# Patient Record
Sex: FEMALE | Race: WHITE | Employment: FULL TIME | URBAN - NONMETROPOLITAN AREA
[De-identification: names, ages, dates, MRNs, and addresses within clinical notes are randomized per-mention and may not be internally consistent; named-entity substitution may affect disease eponyms.]

---

## 2017-05-28 ENCOUNTER — OFFICE VISIT (OUTPATIENT)
Dept: PRIMARY CARE CLINIC | Age: 27
End: 2017-05-28
Payer: COMMERCIAL

## 2017-05-28 VITALS
HEART RATE: 119 BPM | RESPIRATION RATE: 18 BRPM | WEIGHT: 171.8 LBS | BODY MASS INDEX: 30.44 KG/M2 | HEIGHT: 63 IN | DIASTOLIC BLOOD PRESSURE: 84 MMHG | SYSTOLIC BLOOD PRESSURE: 128 MMHG | OXYGEN SATURATION: 97 % | TEMPERATURE: 98.3 F

## 2017-05-28 DIAGNOSIS — G89.29 CHRONIC MIDLINE LOW BACK PAIN WITHOUT SCIATICA: Primary | ICD-10-CM

## 2017-05-28 DIAGNOSIS — M54.50 CHRONIC MIDLINE LOW BACK PAIN WITHOUT SCIATICA: Primary | ICD-10-CM

## 2017-05-28 PROCEDURE — 99213 OFFICE O/P EST LOW 20 MIN: CPT | Performed by: FAMILY MEDICINE

## 2017-05-28 PROCEDURE — 96372 THER/PROPH/DIAG INJ SC/IM: CPT | Performed by: FAMILY MEDICINE

## 2017-05-28 RX ORDER — KETOROLAC TROMETHAMINE 30 MG/ML
30 INJECTION, SOLUTION INTRAMUSCULAR; INTRAVENOUS ONCE
Status: COMPLETED | OUTPATIENT
Start: 2017-05-28 | End: 2017-05-28

## 2017-05-28 RX ADMIN — KETOROLAC TROMETHAMINE 30 MG: 30 INJECTION, SOLUTION INTRAMUSCULAR; INTRAVENOUS at 16:06

## 2017-05-28 ASSESSMENT — ENCOUNTER SYMPTOMS
DIARRHEA: 0
ABDOMINAL PAIN: 0
BACK PAIN: 1
CONSTIPATION: 0
BOWEL INCONTINENCE: 0

## 2019-07-20 ENCOUNTER — OFFICE VISIT (OUTPATIENT)
Dept: PRIMARY CARE CLINIC | Age: 29
End: 2019-07-20
Payer: COMMERCIAL

## 2019-07-20 ENCOUNTER — HOSPITAL ENCOUNTER (OUTPATIENT)
Age: 29
Setting detail: SPECIMEN
Discharge: HOME | End: 2019-07-20
Payer: COMMERCIAL

## 2019-07-20 VITALS
TEMPERATURE: 98.2 F | SYSTOLIC BLOOD PRESSURE: 110 MMHG | WEIGHT: 169.8 LBS | DIASTOLIC BLOOD PRESSURE: 82 MMHG | HEART RATE: 64 BPM | BODY MASS INDEX: 30.09 KG/M2 | HEIGHT: 63 IN

## 2019-07-20 DIAGNOSIS — R30.0 DYSURIA: ICD-10-CM

## 2019-07-20 DIAGNOSIS — R21 RASH AND NONSPECIFIC SKIN ERUPTION: ICD-10-CM

## 2019-07-20 DIAGNOSIS — R30.0 DYSURIA: Primary | ICD-10-CM

## 2019-07-20 LAB
-: ABNORMAL
AMORPHOUS: ABNORMAL
BACTERIA: ABNORMAL
BILIRUBIN URINE: NEGATIVE
CASTS UA: ABNORMAL /LPF (ref 0–2)
COLOR: ABNORMAL
COMMENT UA: ABNORMAL
CRYSTALS, UA: ABNORMAL /HPF
DIRECT EXAM: ABNORMAL
EPITHELIAL CELLS UA: ABNORMAL /HPF (ref 0–5)
GLUCOSE URINE: NEGATIVE
KETONES, URINE: NEGATIVE
LEUKOCYTE ESTERASE, URINE: NEGATIVE
Lab: ABNORMAL
MUCUS: ABNORMAL
NITRITE, URINE: NEGATIVE
OTHER OBSERVATIONS UA: ABNORMAL
PH UA: 7 (ref 5–6)
PROTEIN UA: NEGATIVE
RBC UA: ABNORMAL /HPF (ref 0–4)
RENAL EPITHELIAL, UA: ABNORMAL /HPF
SPECIFIC GRAVITY UA: 1.01 (ref 1.01–1.02)
SPECIMEN DESCRIPTION: ABNORMAL
TRICHOMONAS: ABNORMAL
TURBIDITY: ABNORMAL
URINE HGB: NEGATIVE
UROBILINOGEN, URINE: NORMAL
WBC UA: ABNORMAL /HPF (ref 0–4)
YEAST: ABNORMAL

## 2019-07-20 PROCEDURE — 87660 TRICHOMONAS VAGIN DIR PROBE: CPT

## 2019-07-20 PROCEDURE — 87510 GARDNER VAG DNA DIR PROBE: CPT

## 2019-07-20 PROCEDURE — G8427 DOCREV CUR MEDS BY ELIG CLIN: HCPCS | Performed by: NURSE PRACTITIONER

## 2019-07-20 PROCEDURE — 99213 OFFICE O/P EST LOW 20 MIN: CPT | Performed by: NURSE PRACTITIONER

## 2019-07-20 PROCEDURE — 4004F PT TOBACCO SCREEN RCVD TLK: CPT | Performed by: NURSE PRACTITIONER

## 2019-07-20 PROCEDURE — 81001 URINALYSIS AUTO W/SCOPE: CPT

## 2019-07-20 PROCEDURE — G8417 CALC BMI ABV UP PARAM F/U: HCPCS | Performed by: NURSE PRACTITIONER

## 2019-07-20 PROCEDURE — 87086 URINE CULTURE/COLONY COUNT: CPT

## 2019-07-20 PROCEDURE — 87480 CANDIDA DNA DIR PROBE: CPT

## 2019-07-20 RX ORDER — TRIAMCINOLONE ACETONIDE 1 MG/G
CREAM TOPICAL
Qty: 1 TUBE | Refills: 1 | Status: SHIPPED | OUTPATIENT
Start: 2019-07-20

## 2019-07-20 RX ORDER — IBUPROFEN 200 MG
200 TABLET ORAL PRN
COMMUNITY

## 2019-07-20 RX ORDER — ARIPIPRAZOLE 5 MG/1
5 TABLET ORAL DAILY
COMMUNITY
Start: 2019-04-18

## 2019-07-20 RX ORDER — CYCLOBENZAPRINE HCL 10 MG
10 TABLET ORAL PRN
COMMUNITY
Start: 2018-01-24

## 2019-07-20 ASSESSMENT — ENCOUNTER SYMPTOMS
RESPIRATORY NEGATIVE: 1
RHINORRHEA: 0
NAUSEA: 0
SORE THROAT: 0
COUGH: 0
DIARRHEA: 0
SHORTNESS OF BREATH: 0
VOMITING: 0
GASTROINTESTINAL NEGATIVE: 1

## 2019-07-20 NOTE — PATIENT INSTRUCTIONS
Patient Education        Painful Urination (Dysuria): Care Instructions  Your Care Instructions  Burning pain with urination (dysuria) is a common symptom of a urinary tract infection or other urinary problems. The bladder may become inflamed. This can cause pain when the bladder fills and empties. You may also feel pain if the tube that carries urine from the bladder to the outside of the body (urethra) gets irritated or infected. Sexually transmitted infections (STIs) also may cause pain when you urinate. Sometimes the pain can be caused by things other than an infection. The urethra can be irritated by soaps, perfumes, or foreign objects in the urethra. Kidney stones can cause pain when they pass through the urethra. The cause may be hard to find. You may need tests. Treatment for painful urination depends on the cause. Follow-up care is a key part of your treatment and safety. Be sure to make and go to all appointments, and call your doctor if you are having problems. It's also a good idea to know your test results and keep a list of the medicines you take. How can you care for yourself at home? · Drink extra water for the next day or two. This will help make the urine less concentrated. (If you have kidney, heart, or liver disease and have to limit fluids, talk with your doctor before you increase the amount of fluids you drink.)  · Avoid drinks that are carbonated or have caffeine. They can irritate the bladder. · Urinate often. Try to empty your bladder each time. For women:  · Urinate right after you have sex. · After going to the bathroom, wipe from front to back. · Avoid douches, bubble baths, and feminine hygiene sprays. And avoid other feminine hygiene products that have deodorants. When should you call for help? Call your doctor now or seek immediate medical care if:    · You have new symptoms, such as fever, nausea, or vomiting.     · You have new or worse symptoms of a urinary problem. For example:  ? You have blood or pus in your urine. ? You have chills or body aches. ? It hurts worse to urinate. ? You have groin or belly pain. ? You have pain in your back just below your rib cage (the flank area).    Watch closely for changes in your health, and be sure to contact your doctor if you have any problems. Where can you learn more? Go to [email or web address]. org and sign in to your [employer] account. Enter P277 in the BitIn[employer] box to learn more about \"Painful Urination (Dysuria): Care Instructions. \"     If you do not have an account, please click on the \"Sign Up Now\" link. Current as of: December 19, 2018  Content Version: 12.0  © 1667-5958 [employer]. Care instructions adapted under license by [facility] ([facility]). If you have questions about a medical condition or this instruction, always ask your healthcare professional. [place] [address] any warranty or liability for your use of this information. Patient Education        Rash: Care Instructions  Your Care Instructions  A rash is any irritation or inflammation of the skin. Rashes have many possible causes, including allergy, infection, illness, heat, and emotional stress. Follow-up care is a key part of your treatment and safety. Be sure to make and go to all appointments, and call your doctor if you are having problems. It's also a good idea to know your test results and keep a list of the medicines you take. How can you care for yourself at home? · Wash the area with water only. Soap can make dryness and itching worse. Pat dry. · Put cold, wet cloths on the rash to reduce itching. · Keep cool, and stay out of the sun. · Leave the rash open to the air as much of the time as possible. · Sometimes petroleum jelly (Vaseline) can help relieve the discomfort caused by a rash. A moisturizing lotion, such as Cetaphil, also may help.  Calamine lotion may help for rashes caused by

## 2019-07-21 DIAGNOSIS — B37.31 VAGINAL YEAST INFECTION: Primary | ICD-10-CM

## 2019-07-21 LAB
CULTURE: NORMAL
Lab: NORMAL
SPECIMEN DESCRIPTION: NORMAL

## 2019-07-21 RX ORDER — FLUCONAZOLE 150 MG/1
TABLET ORAL
Qty: 2 TABLET | Refills: 0 | Status: SHIPPED | OUTPATIENT
Start: 2019-07-21

## 2021-03-24 ENCOUNTER — HOSPITAL ENCOUNTER (EMERGENCY)
Age: 31
Discharge: HOME | End: 2021-03-25
Attending: EMERGENCY MEDICINE
Payer: COMMERCIAL

## 2021-03-24 DIAGNOSIS — T88.7XXA MEDICATION SIDE EFFECT: ICD-10-CM

## 2021-03-24 DIAGNOSIS — R07.9 CHEST PAIN, UNSPECIFIED TYPE: Primary | ICD-10-CM

## 2021-03-24 LAB
ABSOLUTE EOS #: 0.03 K/UL (ref 0–0.44)
ABSOLUTE IMMATURE GRANULOCYTE: 0.03 K/UL (ref 0–0.3)
ABSOLUTE LYMPH #: 4.32 K/UL (ref 1.1–3.7)
ABSOLUTE MONO #: 0.76 K/UL (ref 0.1–1.2)
ANION GAP SERPL CALCULATED.3IONS-SCNC: 14 MMOL/L (ref 9–17)
BASOPHILS # BLD: 0 % (ref 0–2)
BASOPHILS ABSOLUTE: 0.04 K/UL (ref 0–0.2)
BUN BLDV-MCNC: 11 MG/DL (ref 6–20)
BUN/CREAT BLD: 13 (ref 9–20)
CALCIUM SERPL-MCNC: 9.4 MG/DL (ref 8.6–10.4)
CHLORIDE BLD-SCNC: 107 MMOL/L (ref 98–107)
CO2: 23 MMOL/L (ref 20–31)
CREAT SERPL-MCNC: 0.88 MG/DL (ref 0.5–0.9)
D-DIMER QUANTITATIVE: <0.27 MG/L FEU (ref 0–0.59)
DIFFERENTIAL TYPE: ABNORMAL
EOSINOPHILS RELATIVE PERCENT: 0 % (ref 1–4)
GFR AFRICAN AMERICAN: >60 ML/MIN
GFR NON-AFRICAN AMERICAN: >60 ML/MIN
GFR SERPL CREATININE-BSD FRML MDRD: ABNORMAL ML/MIN/{1.73_M2}
GFR SERPL CREATININE-BSD FRML MDRD: ABNORMAL ML/MIN/{1.73_M2}
GLUCOSE BLD-MCNC: 166 MG/DL (ref 70–99)
HCT VFR BLD CALC: 42.3 % (ref 36.3–47.1)
HEMOGLOBIN: 13.8 G/DL (ref 11.9–15.1)
IMMATURE GRANULOCYTES: 0 %
LYMPHOCYTES # BLD: 31 % (ref 24–43)
MCH RBC QN AUTO: 30.4 PG (ref 25.2–33.5)
MCHC RBC AUTO-ENTMCNC: 32.6 G/DL (ref 25.2–33.5)
MCV RBC AUTO: 93.2 FL (ref 82.6–102.9)
MONOCYTES # BLD: 6 % (ref 3–12)
NRBC AUTOMATED: 0 PER 100 WBC
PDW BLD-RTO: 12.1 % (ref 11.8–14.4)
PLATELET # BLD: 294 K/UL (ref 138–453)
PLATELET ESTIMATE: ABNORMAL
PMV BLD AUTO: 9 FL (ref 8.1–13.5)
POC TROPONIN I: <0.02 NG/ML (ref 0–0.08)
POC TROPONIN INTERP: NORMAL
POTASSIUM SERPL-SCNC: 3.3 MMOL/L (ref 3.7–5.3)
RBC # BLD: 4.54 M/UL (ref 3.95–5.11)
RBC # BLD: ABNORMAL 10*6/UL
SEG NEUTROPHILS: 63 % (ref 36–65)
SEGMENTED NEUTROPHILS ABSOLUTE COUNT: 8.71 K/UL (ref 1.5–8.1)
SODIUM BLD-SCNC: 144 MMOL/L (ref 135–144)
WBC # BLD: 13.9 K/UL (ref 3.5–11.3)
WBC # BLD: ABNORMAL 10*3/UL

## 2021-03-24 PROCEDURE — 99283 EMERGENCY DEPT VISIT LOW MDM: CPT

## 2021-03-24 PROCEDURE — 85025 COMPLETE CBC W/AUTO DIFF WBC: CPT

## 2021-03-24 PROCEDURE — 81025 URINE PREGNANCY TEST: CPT

## 2021-03-24 PROCEDURE — 85379 FIBRIN DEGRADATION QUANT: CPT

## 2021-03-24 PROCEDURE — 36415 COLL VENOUS BLD VENIPUNCTURE: CPT

## 2021-03-24 PROCEDURE — 80048 BASIC METABOLIC PNL TOTAL CA: CPT

## 2021-03-24 PROCEDURE — 81001 URINALYSIS AUTO W/SCOPE: CPT

## 2021-03-24 PROCEDURE — 84484 ASSAY OF TROPONIN QUANT: CPT

## 2021-03-24 PROCEDURE — 93005 ELECTROCARDIOGRAM TRACING: CPT | Performed by: EMERGENCY MEDICINE

## 2021-03-24 ASSESSMENT — PAIN DESCRIPTION - LOCATION: LOCATION: CHEST

## 2021-03-24 ASSESSMENT — PAIN DESCRIPTION - FREQUENCY: FREQUENCY: CONTINUOUS

## 2021-03-24 ASSESSMENT — PAIN DESCRIPTION - DESCRIPTORS: DESCRIPTORS: DULL

## 2021-03-25 ENCOUNTER — APPOINTMENT (OUTPATIENT)
Dept: GENERAL RADIOLOGY | Age: 31
End: 2021-03-25
Payer: COMMERCIAL

## 2021-03-25 VITALS
RESPIRATION RATE: 17 BRPM | WEIGHT: 169 LBS | HEART RATE: 82 BPM | TEMPERATURE: 98.8 F | HEIGHT: 63 IN | SYSTOLIC BLOOD PRESSURE: 109 MMHG | DIASTOLIC BLOOD PRESSURE: 82 MMHG | BODY MASS INDEX: 29.95 KG/M2 | OXYGEN SATURATION: 98 %

## 2021-03-25 LAB
-: ABNORMAL
AMORPHOUS: ABNORMAL
BACTERIA: ABNORMAL
BILIRUBIN URINE: NEGATIVE
CASTS UA: ABNORMAL /LPF (ref 0–2)
COLOR: NORMAL
COMMENT UA: NORMAL
CRYSTALS, UA: ABNORMAL /HPF
EPITHELIAL CELLS UA: >50 /HPF (ref 0–5)
GLUCOSE URINE: NEGATIVE
HCG(URINE) PREGNANCY TEST: NEGATIVE
KETONES, URINE: NEGATIVE
LEUKOCYTE ESTERASE, URINE: NEGATIVE
MUCUS: ABNORMAL
NITRITE, URINE: NEGATIVE
OTHER OBSERVATIONS UA: ABNORMAL
PH UA: 6 (ref 5–6)
POC TROPONIN I: <0.02 NG/ML (ref 0–0.08)
POC TROPONIN INTERP: NORMAL
PROTEIN UA: NEGATIVE
RBC UA: ABNORMAL /HPF (ref 0–4)
RENAL EPITHELIAL, UA: ABNORMAL /HPF
SPECIFIC GRAVITY UA: 1.02 (ref 1.01–1.02)
TRICHOMONAS: ABNORMAL
TURBIDITY: NORMAL
URINE HGB: NEGATIVE
UROBILINOGEN, URINE: NORMAL
WBC UA: ABNORMAL /HPF (ref 0–4)
YEAST: ABNORMAL

## 2021-03-25 PROCEDURE — 71046 X-RAY EXAM CHEST 2 VIEWS: CPT

## 2021-03-25 PROCEDURE — 84484 ASSAY OF TROPONIN QUANT: CPT

## 2021-03-25 PROCEDURE — 36415 COLL VENOUS BLD VENIPUNCTURE: CPT

## 2021-03-25 PROCEDURE — 93005 ELECTROCARDIOGRAM TRACING: CPT | Performed by: EMERGENCY MEDICINE

## 2021-03-25 PROCEDURE — 6370000000 HC RX 637 (ALT 250 FOR IP): Performed by: EMERGENCY MEDICINE

## 2021-03-25 RX ORDER — ACETAMINOPHEN 500 MG
1000 TABLET ORAL ONCE
Status: COMPLETED | OUTPATIENT
Start: 2021-03-25 | End: 2021-03-25

## 2021-03-25 RX ADMIN — ACETAMINOPHEN 1000 MG: 500 TABLET, FILM COATED ORAL at 01:08

## 2021-03-25 ASSESSMENT — ENCOUNTER SYMPTOMS
COUGH: 0
VOMITING: 0
NAUSEA: 0

## 2021-03-25 NOTE — ED PROVIDER NOTES
[facility] ED  [address]  [place] [address]  Phone: [phone]  eMERGENCY dEPARTMENT eNCOUnter      Pt Name: Jg Goff  MRN: [id number]  Melida 1990  Date of evaluation: 3/25/21      CHIEF COMPLAINT     Chief Complaint   Patient presents with    Chest Pain     took hydroxyzine and 2 baby asprin PTA, states she felt her heart race, usually is r/t to anxiety but this time she is unsure. HISTORY OF PRESENT ILLNESS    Glenda Martines is a 31 y.o. female who presents today with complaints of chest pain that came on at rest.  She also felt that her heart was racing. Not specifically short of breath. Patient has had some episodes related to anxiety and stress before. Is not sure why she is having the symptoms at this time. Patient denies first-degree relatives with coronary artery disease. She denies a history of hypertension hyperlipidemia diabetes patient does vape. Patient also admits to occasional marijuana use. Patient denies history of amphetamine methamphetamine or cocaine use. Patient has not recently used marijuana. Patient did indicate that she is taking a Medrol Dosepak. Patient has had related anxiety symptoms related to steroids in the past.  Patient was prescribed Vistaril which she took prior to arrival with this a steroid course. Patient uses Depo-Provera injections for birth control. REVIEW OF SYSTEMS     Review of Systems   Constitutional: Negative for fever. HENT: Negative for congestion. Respiratory: Negative for cough. Cardiovascular: Positive for chest pain. Negative for leg swelling. Gastrointestinal: Negative for nausea and vomiting. Skin: Negative for rash. Neurological: Negative for syncope. Psychiatric/Behavioral: Negative for confusion. All other systems reviewed and are negative.     PAST MEDICAL HISTORY    has a past medical history of Acne, Anxiety, Depression, Headache(784.0), Hypoglycemia, and Irritable bowel syndrome. SURGICAL HISTORY      has a past surgical history that includes [place] tooth extraction (5/2014). CURRENT MEDICATIONS       Discharge Medication List as of 3/25/2021  1:54 AM      CONTINUE these medications which have NOT CHANGED    Details   fluconazole (DIFLUCAN) 150 MG tablet Take 1 tab now and then repeat in 1 week., Disp-2 tablet, R-0Normal      ARIPiprazole (ABILIFY) 5 MG tablet Take 5 mg by mouth dailyHistorical Med      cyclobenzaprine (FLEXERIL) 10 MG tablet Take 10 mg by mouth as neededHistorical Med      ibuprofen (ADVIL;MOTRIN) 200 MG tablet Take 200 mg by mouth as neededHistorical Med      sertraline (ZOLOFT) 50 MG tablet Take 50 mg by mouth dailyHistorical Med      triamcinolone (KENALOG) 0.1 % cream Apply topically 2 times daily. , Disp-1 Tube, R-1, Normal      medroxyPROGESTERone (DEPO-PROVERA) 150 MG/ML injection Inject 150 mg into the muscle once. Every three months             ALLERGIES     is allergic to morphine. FAMILY HISTORY     She indicated that her mother is alive. She indicated that her father is alive. She indicated that her brother is alive. She indicated that her maternal grandmother is alive. She indicated that her maternal grandfather is alive. She indicated that her paternal grandmother is alive. She indicated that her paternal grandfather is alive. family history includes Cancer in her maternal grandfather; Other in her mother. SOCIAL HISTORY      reports that she has been smoking. She has a 0.75 pack-year smoking history. She has never used smokeless tobacco. She reports current alcohol use. She reports that she does not use drugs. PHYSICAL EXAM     INITIAL VITALS:  height is 5' 3\" (1.6 m) and weight is 169 lb (76.7 kg). Her tympanic temperature is 98.8 °F (37.1 °C). Her blood pressure is 109/82 and her pulse is 82. Her respiration is 17 and oxygen saturation is 98%. Physical Exam  Vitals signs reviewed.    Constitutional:       General: She is not in acute distress. Appearance: Normal appearance. She is not ill-appearing. HENT:      Head: Normocephalic and atraumatic. Eyes:      Extraocular Movements: Extraocular movements intact. Pupils: Pupils are equal, round, and reactive to light. Cardiovascular:      Rate and Rhythm: Normal rate and regular rhythm. Pulses: Normal pulses. Heart sounds: Normal heart sounds. Pulmonary:      Breath sounds: Normal breath sounds. No wheezing. Abdominal:      Palpations: Abdomen is soft. Tenderness: There is no abdominal tenderness. There is no guarding or rebound. Musculoskeletal: Normal range of motion. General: No tenderness. Right lower leg: No edema. Left lower leg: No edema. Skin:     General: Skin is warm and dry. Capillary Refill: Capillary refill takes less than 2 seconds. Findings: No rash. Neurological:      General: No focal deficit present. Mental Status: She is alert and oriented to person, place, and time. Cranial Nerves: No cranial nerve deficit. Psychiatric:         Mood and Affect: Mood normal.         Behavior: Behavior normal.       DIFFERENTIAL DIAGNOSIS / MDM / EMERGENCY DEPARTMENT COURSE:     Patient overall low risk for ACS but given that she describes the chest discomfort as a tightness in the chest will do cardiac evaluation including serial troponins and EKGs. Patient is overall lower risk for pulmonary embolism however given hormone therapy and tachycardia upon arrival to the emergency department will screen with D-dimer. This test was negative. Repeat troponin which was drawn within 3 hours after the onset of her symptoms is also negative. At this point the patient symptoms have resolved. I feel that she is appropriate for outpatient management although she was educated to return if she has any return of chest pain or pressure or shortness of breath.   This may be related to her recent steroid course that she started. I have reviewed the disposition diagnosis with the patient and or their family/guardian. I have answered their questions and givendischarge instructions. They voiced understanding of these instructions and did not have any further questions or complaints. DIAGNOSTIC RESULTS     EKG: All EKG's are interpreted by the Emergency Department Physician who either signs or Co-signs this chart inthe absence of a cardiologist.    Initial twelve-lead EKG in the emergency department time to 2245 normal sinus rhythm at 94 bpm.  Normal intervals and axis. No acute ST elevations or depressions but nonspecific ST-T wave abnormality is noted. Patient states has never had EKG done before for comparison. Repeat twelve-lead EKG time 0: 49 normal sinus rhythm 85 bpm.  Normal intervals and axis. No acute ST elevations depressions or T wave inversions but there is a nonspecific ST-T wave abnormality. RADIOLOGY:   I directly visualized the following plain film images and reviewed the radiologistinterpretations of radiologic studies:    Xr Chest (2 Vw)    Result Date: 3/25/2021  EXAMINATION: TWO XRAY VIEWS OF THE CHEST 3/24/2021 11:57 pm COMPARISON: 12/21/2011 HISTORY: ORDERING SYSTEM PROVIDED HISTORY: chest pain / leukocytosis TECHNOLOGIST PROVIDED HISTORY: chest pain / leukocytosis Reason for Exam: Chest pain today, elevated WBC Acuity: Acute Type of Exam: Initial FINDINGS: The heart is normal.  The pulmonary vessels are normal.  The lungs are mildly hyperinflated and emphysematous. There is mild scoliosis. No consolidation or effusion is seen. The bones are intact. Stable chronic changes with no acute abnormality seen.      LABS:  Results for orders placed or performed during the hospital encounter of 03/24/21   CBC Auto Differential   Result Value Ref Range    WBC 13.9 (H) 3.5 - 11.3 k/uL    RBC 4.54 3.95 - 5.11 m/uL    Hemoglobin 13.8 11.9 - 15.1 g/dL    Hematocrit 42.3 36.3 - 47.1 %    MCV 93.2 82.6 - 102.9 fL    MCH 30.4 25.2 - 33.5 pg    MCHC 32.6 25.2 - 33.5 g/dL    RDW 12.1 11.8 - 14.4 %    Platelets 029 060 - 023 k/uL    MPV 9.0 8.1 - 13.5 fL    NRBC Automated 0.0 0.0 per 100 WBC    Differential Type NOT REPORTED     Seg Neutrophils 63 36 - 65 %    Lymphocytes 31 24 - 43 %    Monocytes 6 3 - 12 %    Eosinophils % 0 (L) 1 - 4 %    Basophils 0 0 - 2 %    Immature Granulocytes 0 0 %    Segs Absolute 8.71 (H) 1.50 - 8.10 k/uL    Absolute Lymph # 4.32 (H) 1.10 - 3.70 k/uL    Absolute Mono # 0.76 0.10 - 1.20 k/uL    Absolute Eos # 0.03 0.00 - 0.44 k/uL    Basophils Absolute 0.04 0.00 - 0.20 k/uL    Absolute Immature Granulocyte 0.03 0.00 - 0.30 k/uL    WBC Morphology NOT REPORTED     RBC Morphology NOT REPORTED     Platelet Estimate NOT REPORTED    Basic Metabolic Panel   Result Value Ref Range    Glucose 166 (H) 70 - 99 mg/dL    BUN 11 6 - 20 mg/dL    CREATININE 0.88 0.50 - 0.90 mg/dL    Bun/Cre Ratio 13 9 - 20    Calcium 9.4 8.6 - 10.4 mg/dL    Sodium 144 135 - 144 mmol/L    Potassium 3.3 (L) 3.7 - 5.3 mmol/L    Chloride 107 98 - 107 mmol/L    CO2 23 20 - 31 mmol/L    Anion Gap 14 9 - 17 mmol/L    GFR Non-African American >60 >60 mL/min    GFR African American >60 >60 mL/min    GFR Comment          GFR Staging NOT REPORTED    D-Dimer, Quantitative   Result Value Ref Range    D-Dimer, Quant <0.27 0.00 - 0.59 mg/L FEU   Urinalysis Reflex to Culture    Specimen: Urine, clean catch   Result Value Ref Range    Color, UA NOT REPORTED YELLOW    Turbidity UA NOT REPORTED CLEAR    Glucose, Ur NEGATIVE NEGATIVE    Bilirubin Urine NEGATIVE NEGATIVE    Ketones, Urine NEGATIVE NEGATIVE    Specific Gravity, UA 1.020 1.010 - 1.025    Urine Hgb NEGATIVE NEGATIVE    pH, UA 6.0 5.0 - 6.0    Protein, UA NEGATIVE NEGATIVE    Urobilinogen, Urine Normal Normal    Nitrite, Urine NEGATIVE NEGATIVE    Leukocyte Esterase, Urine NEGATIVE NEGATIVE    Urinalysis Comments NOT REPORTED    Pregnancy, Urine   Result Value Ref Range HCG(Urine) Pregnancy Test NEGATIVE NEGATIVE   Microscopic Urinalysis   Result Value Ref Range    -          WBC, UA 0 TO 4 0 - 4 /HPF    RBC, UA 0 TO 4 0 - 4 /HPF    Casts UA NOT REPORTED 0 - 2 /LPF    Crystals, UA NOT REPORTED None /HPF    Epithelial Cells UA >50 0 - 5 /HPF    Renal Epithelial, UA NOT REPORTED 0 /HPF    Bacteria, UA 1+ (A) None    Mucus, UA NOT REPORTED None    Trichomonas, UA NOT REPORTED None    Amorphous, UA NOT REPORTED None    Other Observations UA NOT REPORTED NOT REQ. Yeast, UA NOT REPORTED None   POCT troponin   Result Value Ref Range    POC Troponin I <0.02 0.00 - 0.08 ng/mL    POC Troponin Interp         POCT troponin   Result Value Ref Range    POC Troponin I <0.02 0.00 - 0.08 ng/mL    POC Troponin Interp         EKG 12 Lead   Result Value Ref Range    Ventricular Rate 94 BPM    Atrial Rate 94 BPM    P-R Interval 142 ms    QRS Duration 82 ms    Q-T Interval 338 ms    QTc Calculation (Bazett) 422 ms    P Axis 45 degrees    R Axis 37 degrees    T Axis 3 degrees   EKG 12 Lead   Result Value Ref Range    Ventricular Rate 85 BPM    Atrial Rate 85 BPM    P-R Interval 134 ms    QRS Duration 84 ms    Q-T Interval 354 ms    QTc Calculation (Bazett) 421 ms    P Axis 42 degrees    R Axis 42 degrees    T Axis 24 degrees       EMERGENCY DEPARTMENT COURSE:   Vitals:    Vitals:    03/24/21 2326 03/25/21 0109 03/25/21 0130 03/25/21 0153   BP:   109/82    Pulse: 93 88 85 82   Resp: 14 15 20 17   Temp:       TempSrc:       SpO2: 99% 97% 99% 98%   Weight:       Height:         -------------------------  BP: 109/82, Temp: 98.8 °F (37.1 °C), Pulse: 82, Resp: 17      CONSULTS:  None    PROCEDURES:  None    FINAL IMPRESSION      1. Chest pain, unspecified type    2.  Medication side effect          DISPOSITION/PLAN   DISPOSITION Decision To Discharge 03/25/2021 01:53:39 AM      PATIENT REFERRED TO:  Edwardo Souza MD  [address] [id number]  [place] [address] [place]  [phone]    In 2 days        DISCHARGE MEDICATIONS:  Discharge Medication List as of 3/25/2021  1:54 AM          (Please note that portions of this note were completed with avoice recognition program.  Efforts were made to edit the dictations but occasionally words are mis-transcribed.)    Cruzito Khoury MD, [facility]  Attending Emergency Medicine Physician        Cruzito Khoury MD  03/25/21 0700

## 2021-03-26 LAB
EKG ATRIAL RATE: 85 BPM
EKG ATRIAL RATE: 94 BPM
EKG P AXIS: 42 DEGREES
EKG P AXIS: 45 DEGREES
EKG P-R INTERVAL: 134 MS
EKG P-R INTERVAL: 142 MS
EKG Q-T INTERVAL: 338 MS
EKG Q-T INTERVAL: 354 MS
EKG QRS DURATION: 82 MS
EKG QRS DURATION: 84 MS
EKG QTC CALCULATION (BAZETT): 421 MS
EKG QTC CALCULATION (BAZETT): 422 MS
EKG R AXIS: 37 DEGREES
EKG R AXIS: 42 DEGREES
EKG T AXIS: 24 DEGREES
EKG T AXIS: 3 DEGREES
EKG VENTRICULAR RATE: 85 BPM
EKG VENTRICULAR RATE: 94 BPM

## 2024-09-24 ENCOUNTER — HOSPITAL ENCOUNTER (INPATIENT)
Age: 34
LOS: 3 days | Discharge: HOME | End: 2024-09-27
Attending: PSYCHIATRY & NEUROLOGY | Admitting: PSYCHIATRY & NEUROLOGY

## 2024-09-24 PROBLEM — R45.851 DEPRESSION WITH SUICIDAL IDEATION: Status: ACTIVE | Noted: 2024-09-24

## 2024-09-24 PROBLEM — F32.A DEPRESSION WITH SUICIDAL IDEATION: Status: ACTIVE | Noted: 2024-09-24

## 2024-09-24 PROCEDURE — 1240000000 HC EMOTIONAL WELLNESS R&B

## 2024-09-24 PROCEDURE — 6370000000 HC RX 637 (ALT 250 FOR IP): Performed by: PSYCHIATRY & NEUROLOGY

## 2024-09-24 RX ORDER — DIPHENHYDRAMINE HYDROCHLORIDE 50 MG/ML
50 INJECTION INTRAMUSCULAR; INTRAVENOUS EVERY 6 HOURS PRN
Status: DISCONTINUED | OUTPATIENT
Start: 2024-09-24 | End: 2024-09-27 | Stop reason: HOSPADM

## 2024-09-24 RX ORDER — POLYETHYLENE GLYCOL 3350 17 G/17G
17 POWDER, FOR SOLUTION ORAL DAILY PRN
Status: DISCONTINUED | OUTPATIENT
Start: 2024-09-24 | End: 2024-09-27 | Stop reason: HOSPADM

## 2024-09-24 RX ORDER — HALOPERIDOL 5 MG/1
5 TABLET ORAL EVERY 6 HOURS PRN
Status: DISCONTINUED | OUTPATIENT
Start: 2024-09-24 | End: 2024-09-27 | Stop reason: HOSPADM

## 2024-09-24 RX ORDER — POLYETHYLENE GLYCOL 3350 17 G
2 POWDER IN PACKET (EA) ORAL
Status: DISCONTINUED | OUTPATIENT
Start: 2024-09-24 | End: 2024-09-27 | Stop reason: HOSPADM

## 2024-09-24 RX ORDER — HALOPERIDOL 5 MG/ML
5 INJECTION INTRAMUSCULAR EVERY 6 HOURS PRN
Status: DISCONTINUED | OUTPATIENT
Start: 2024-09-24 | End: 2024-09-27 | Stop reason: HOSPADM

## 2024-09-24 RX ORDER — HYDROXYZINE HYDROCHLORIDE 50 MG/1
50 TABLET, FILM COATED ORAL 3 TIMES DAILY PRN
Status: DISCONTINUED | OUTPATIENT
Start: 2024-09-24 | End: 2024-09-27 | Stop reason: HOSPADM

## 2024-09-24 RX ORDER — TRAZODONE HYDROCHLORIDE 50 MG/1
50 TABLET, FILM COATED ORAL NIGHTLY PRN
Status: DISCONTINUED | OUTPATIENT
Start: 2024-09-24 | End: 2024-09-27 | Stop reason: HOSPADM

## 2024-09-24 RX ORDER — MAGNESIUM HYDROXIDE/ALUMINUM HYDROXICE/SIMETHICONE 120; 1200; 1200 MG/30ML; MG/30ML; MG/30ML
30 SUSPENSION ORAL EVERY 6 HOURS PRN
Status: DISCONTINUED | OUTPATIENT
Start: 2024-09-24 | End: 2024-09-27 | Stop reason: HOSPADM

## 2024-09-24 RX ORDER — ACETAMINOPHEN 325 MG/1
650 TABLET ORAL EVERY 6 HOURS PRN
Status: DISCONTINUED | OUTPATIENT
Start: 2024-09-24 | End: 2024-09-27 | Stop reason: HOSPADM

## 2024-09-24 RX ORDER — IBUPROFEN 400 MG/1
400 TABLET, FILM COATED ORAL EVERY 6 HOURS PRN
Status: DISCONTINUED | OUTPATIENT
Start: 2024-09-24 | End: 2024-09-27 | Stop reason: HOSPADM

## 2024-09-24 RX ADMIN — HYDROXYZINE HYDROCHLORIDE 50 MG: 50 TABLET, FILM COATED ORAL at 19:54

## 2024-09-24 RX ADMIN — NICOTINE POLACRILEX 2 MG: 2 LOZENGE ORAL at 19:17

## 2024-09-24 ASSESSMENT — PATIENT HEALTH QUESTIONNAIRE - PHQ9
SUM OF ALL RESPONSES TO PHQ QUESTIONS 1-9: 16
8. MOVING OR SPEAKING SO SLOWLY THAT OTHER PEOPLE COULD HAVE NOTICED. OR THE OPPOSITE, BEING SO FIGETY OR RESTLESS THAT YOU HAVE BEEN MOVING AROUND A LOT MORE THAN USUAL: MORE THAN HALF THE DAYS
10. IF YOU CHECKED OFF ANY PROBLEMS, HOW DIFFICULT HAVE THESE PROBLEMS MADE IT FOR YOU TO DO YOUR WORK, TAKE CARE OF THINGS AT HOME, OR GET ALONG WITH OTHER PEOPLE: VERY DIFFICULT
4. FEELING TIRED OR HAVING LITTLE ENERGY: MORE THAN HALF THE DAYS
1. LITTLE INTEREST OR PLEASURE IN DOING THINGS: MORE THAN HALF THE DAYS
SUM OF ALL RESPONSES TO PHQ QUESTIONS 1-9: 18
SUM OF ALL RESPONSES TO PHQ QUESTIONS 1-9: 18
7. TROUBLE CONCENTRATING ON THINGS, SUCH AS READING THE NEWSPAPER OR WATCHING TELEVISION: MORE THAN HALF THE DAYS
9. THOUGHTS THAT YOU WOULD BE BETTER OFF DEAD, OR OF HURTING YOURSELF: MORE THAN HALF THE DAYS
6. FEELING BAD ABOUT YOURSELF - OR THAT YOU ARE A FAILURE OR HAVE LET YOURSELF OR YOUR FAMILY DOWN: MORE THAN HALF THE DAYS
2. FEELING DOWN, DEPRESSED OR HOPELESS: MORE THAN HALF THE DAYS
5. POOR APPETITE OR OVEREATING: MORE THAN HALF THE DAYS
SUM OF ALL RESPONSES TO PHQ QUESTIONS 1-9: 18
3. TROUBLE FALLING OR STAYING ASLEEP: MORE THAN HALF THE DAYS
SUM OF ALL RESPONSES TO PHQ9 QUESTIONS 1 & 2: 4

## 2024-09-24 ASSESSMENT — PAIN SCALES - GENERAL: PAINLEVEL_OUTOF10: 0

## 2024-09-24 ASSESSMENT — LIFESTYLE VARIABLES
HOW MANY STANDARD DRINKS CONTAINING ALCOHOL DO YOU HAVE ON A TYPICAL DAY: 1 OR 2
HOW OFTEN DO YOU HAVE A DRINK CONTAINING ALCOHOL: MONTHLY OR LESS

## 2024-09-24 ASSESSMENT — SLEEP AND FATIGUE QUESTIONNAIRES
DO YOU USE A SLEEP AID: NO
DO YOU HAVE DIFFICULTY SLEEPING: NO
AVERAGE NUMBER OF SLEEP HOURS: 6

## 2024-09-24 NOTE — BH NOTE
Patient given tobacco quitline number 28484755193 at this time, refusing to call at this time, states \" I just dont want to quit now\"- patient given information as to the dangers of long term tobacco use. Continue to reinforce the importance of tobacco cessation.

## 2024-09-25 PROBLEM — F33.2 MAJOR DEPRESSIVE DISORDER, RECURRENT SEVERE WITHOUT PSYCHOTIC FEATURES (HCC): Status: ACTIVE | Noted: 2024-09-25

## 2024-09-25 PROCEDURE — 1240000000 HC EMOTIONAL WELLNESS R&B

## 2024-09-25 PROCEDURE — 99222 1ST HOSP IP/OBS MODERATE 55: CPT | Performed by: INTERNAL MEDICINE

## 2024-09-25 PROCEDURE — 6370000000 HC RX 637 (ALT 250 FOR IP): Performed by: PSYCHIATRY & NEUROLOGY

## 2024-09-25 PROCEDURE — APPSS60 APP SPLIT SHARED TIME 46-60 MINUTES

## 2024-09-25 PROCEDURE — 99223 1ST HOSP IP/OBS HIGH 75: CPT | Performed by: PSYCHIATRY & NEUROLOGY

## 2024-09-25 RX ORDER — SERTRALINE HYDROCHLORIDE 100 MG/1
100 TABLET, FILM COATED ORAL DAILY
Status: DISCONTINUED | OUTPATIENT
Start: 2024-09-25 | End: 2024-09-27 | Stop reason: HOSPADM

## 2024-09-25 RX ORDER — PROPRANOLOL HCL 20 MG
10 TABLET ORAL 3 TIMES DAILY
Status: DISCONTINUED | OUTPATIENT
Start: 2024-09-25 | End: 2024-09-27 | Stop reason: HOSPADM

## 2024-09-25 RX ORDER — ARIPIPRAZOLE 5 MG/1
5 TABLET ORAL DAILY
Status: DISCONTINUED | OUTPATIENT
Start: 2024-09-25 | End: 2024-09-27 | Stop reason: HOSPADM

## 2024-09-25 RX ORDER — PROPRANOLOL HCL 10 MG
10 TABLET ORAL 3 TIMES DAILY
Status: ON HOLD | COMMUNITY
End: 2024-09-27

## 2024-09-25 RX ADMIN — ARIPIPRAZOLE 5 MG: 5 TABLET ORAL at 20:54

## 2024-09-25 RX ADMIN — PROPRANOLOL HYDROCHLORIDE 10 MG: 20 TABLET ORAL at 20:54

## 2024-09-25 RX ADMIN — HYDROXYZINE HYDROCHLORIDE 50 MG: 50 TABLET, FILM COATED ORAL at 12:44

## 2024-09-25 RX ADMIN — NICOTINE POLACRILEX 2 MG: 2 LOZENGE ORAL at 12:44

## 2024-09-25 RX ADMIN — NICOTINE POLACRILEX 2 MG: 2 LOZENGE ORAL at 11:02

## 2024-09-25 RX ADMIN — SERTRALINE 100 MG: 100 TABLET, FILM COATED ORAL at 20:55

## 2024-09-25 RX ADMIN — NICOTINE POLACRILEX 2 MG: 2 LOZENGE ORAL at 15:09

## 2024-09-25 RX ADMIN — NICOTINE POLACRILEX 2 MG: 2 LOZENGE ORAL at 17:30

## 2024-09-25 RX ADMIN — HYDROXYZINE HYDROCHLORIDE 50 MG: 50 TABLET, FILM COATED ORAL at 20:55

## 2024-09-25 RX ADMIN — NICOTINE POLACRILEX 2 MG: 2 LOZENGE ORAL at 07:10

## 2024-09-25 RX ADMIN — NICOTINE POLACRILEX 2 MG: 2 LOZENGE ORAL at 19:43

## 2024-09-25 RX ADMIN — NICOTINE POLACRILEX 2 MG: 2 LOZENGE ORAL at 09:20

## 2024-09-25 RX ADMIN — ACETAMINOPHEN 650 MG: 325 TABLET ORAL at 07:10

## 2024-09-25 ASSESSMENT — PAIN SCALES - GENERAL
PAINLEVEL_OUTOF10: 0
PAINLEVEL_OUTOF10: 3
PAINLEVEL_OUTOF10: 7

## 2024-09-25 ASSESSMENT — PAIN DESCRIPTION - LOCATION: LOCATION: BACK

## 2024-09-25 ASSESSMENT — PAIN DESCRIPTION - ORIENTATION: ORIENTATION: LOWER

## 2024-09-25 NOTE — GROUP NOTE
Group Therapy Note    Date: 9/25/2024    Group Start Time: 1100  Group End Time: 1145  Group Topic: Psychoeducation    [facility]    Swati Pop CTRS    Group Therapy Note    Attendees: 12/18     Patient's Goal:  Patient will identify benefits of music for coping and stress management    Notes:  Patient attended group and participated    Status After Intervention:  Improved    Participation Level: Active Listener and Interactive    Participation Quality: Appropriate, Attentive, Sharing, and Supportive      Speech:  normal      Thought Process/Content: Logical  Linear      Affective Functioning: Constricted      Mood: Euthymic      Level of consciousness:  Alert, Oriented x4, and Attentive      Response to Learning: Able to verbalize current knowledge/experience, Able to verbalize/acknowledge new learning, Able to retain information, Capable of insight, Able to change behavior, and Progressing to goal      Endings: None Reported    Modes of Intervention: Education, Support, Socialization, and Exploration      Discipline Responsible: Psychoeducational Specialist      Signature:  MACKENZIE Looney

## 2024-09-25 NOTE — PLAN OF CARE
Problem: Depression  Goal: Will be euthymic at discharge  Description: INTERVENTIONS:  1. Administer medication as ordered  2. Provide emotional support via 1:1 interaction with staff  3. Encourage involvement in milieu/groups/activities  4. Monitor for social isolation  9/25/2024 1735 by Autumn Rios LPN  Outcome: Progressing  Note: Patient denies thoughts of suicide or self harm and denies hallucinations. Patient admits to some depression and anxiety. Patient has been tearful this shift. Patient is attending groups and social with peers in dayroom. Patient has no scheduled meds this shift. Patient has been able to maintain behavioral control. Patient states she has talked to her mom and she is able to go and stay with her so she is feeling much better than she did when she came in. Safe environment maintained.  Q15 minute checks for safety continued per unit policy.  Will continue to monitor for safety and provides support and reassurance as needed.    9/25/2024 1222 by Temi Glover, RN  Outcome: Progressing     Problem: Risk for Elopement  Goal: Patient will not exit the unit/facility without proper excort  9/25/2024 1735 by Autumn Rios LPN  Outcome: Progressing  Flowsheets (Taken 9/25/2024 1703)  Nursing Interventions for Elopement Risk: Make sure patient has all necessary personal care items  Note: No exit seeking behaviors noted this shift. Patient denies thoughts to elope.   9/25/2024 1222 by Temi Glover, RN  Outcome: Progressing

## 2024-09-25 NOTE — PROGRESS NOTES
Pharmacy Medication History Note      List of current medications patient is taking is complete.    Source of information: RACHANAS, Paper Chart from Augie Barajas ([phone], [facility]), [place] ([phone], Jigar), Care Everywhere    Changes made to medication list:  Medications removed (include reason, ex. therapy complete or physician discontinued, noncompliance):  Medroxyprogesterone - patient had tube ligation in 2023, has been off birth control since  Cyclobenzaprine - therapy complete  Fluconazole - therapy complete  Triamcinolone cream - therapy complete     Medications flagged for provider review:  none    Medications added/doses adjusted:  Added propranolol 10 mg three times daily  Adjusted Sertraline to 100 mg in the morning     Other notes (ex. Recent course of antibiotics, Coumadin dosing):  Patient did not picked up Propranolol prescription on August 6th, unclear as to why      Current Home Medication List at Time of Admission:  Prior to Admission medications    Medication Sig   propranolol (INDERAL) 10 MG tablet Take 1 tablet by mouth 3 times daily   ARIPiprazole (ABILIFY) 5 MG tablet Take 1 tablet by mouth daily   ibuprofen (ADVIL;MOTRIN) 200 MG tablet Take 200 mg by mouth as needed   sertraline (ZOLOFT) 100 MG tablet Take 1 tablet by mouth daily         Please let me know if you have any questions about this encounter. Thank you!    Electronically signed by Noemy Garcia RP on 9/25/2024 at 10:06 AM

## 2024-09-25 NOTE — GROUP NOTE
Group Therapy Note    Date: 9/25/2024    Group Start Time: 0900  Group End Time: 0920  Group Topic: Community Meeting    Reuben Mcclure        Group Therapy Note    Attendees: 7       Patient's Goal:  speak to the Dr about meds    Status After Intervention:  Improved    Participation Level: Active Listener and Interactive    Participation Quality: Appropriate and Attentive      Speech:  normal      Thought Process/Content: Logical      Affective Functioning: Congruent      Mood: euthymic      Level of consciousness:  Alert and Oriented x4      Response to Learning: Able to verbalize current knowledge/experience, Capable of insight, and Progressing to goal      Endings: None Reported    Modes of Intervention: Education      Discipline Responsible: Behavorial Health Tech      Signature:  Reuben Fournier

## 2024-09-25 NOTE — H&P
Department of Psychiatry  Attending Physician Psychiatric Assessment   Patient: Bg Bello  MRN: [id number]  Reason for Admission to Psychiatric Unit:  Threat to self requiring 24 hour professional observation  Concerns about patient's safety in the community  Past Mental Health Diagnosis: a history of  Major Depression, Anxiety Disorder, Prior suicide attempt, and Alcohol and/or Drug Use Disorder  Triggering event or precipitating factor: Housing instability, Relationship Issues, and Psych Treatment Noncompliance  Length of time/duration of triggering event: Weeks  Legal Status: Involuntary    CHIEF COMPLAINT:  depression with suicidal ideation    History obtained from: Patient, electronic medical record          HISTORY OF PRESENT ILLNESS:    Bg Bello is a 34 y.o. female who has a past medical history of mental health. Patient presented to the ED on application for emergency admission for worsening depression with suicidal ideation with a plan. It does not appear patient has ever been admitted to this facility in the past.       Patient is agreeable to assessment at bedside today. Bg is very tearful and crying during assessment, but is cooperative and forthcoming. Patient states that she has been having worsening depression over the last couple of weeks. 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 This left her without insurance and she was unable to fill her medications. Bg was previously taking Abilify, Zoloft, Propanolol, and Atarax. She has been without her medications for at least 2 weeks. She is now working full time again, but her benefits will not start until November. She has felt this worsening depression and suicidal thoughts with a plan for the last couple of weeks. She reports that she has had thoughts that \"I did not want to make it to 34\" and \"I feel unimportant\". Bg has been having  wheezing.    Cardiovascular: Negative for chest pain and palpitations.   Gastrointestinal: Negative for abdominal pain, diarrhea and vomiting.   Genitourinary: Negative for dysuria and urgency.   Musculoskeletal: Negative for falls and joint pain.   Skin: Negative for itching and rash.   Neurological: Negative for tremors, seizures and weakness.   Endo/Heme/Allergies: Does not bruise/bleed easily.      Mental Status Examination:    Level of consciousness: Awake and alert  Appearance:  Appropriate attire, seated on bed, fair grooming   Behavior/Motor: Approachable, tearful  Attitude toward examiner:  Cooperative, attentive, good eye contact  Speech: Normal rate, volume, and tone.  Mood: Depressed.   Affect: Mood-congruent.   Thought processes:  Linear and coherent.  Thought content: Active suicidal ideations, with a  current plan or intent              Denies homicidal ideations               Denies hallucinations              Denies delusions              Denies paranoia  Cognition:  Oriented to self, location, time, situation  Concentration: Clinically adequate  Memory: Intact  Insight &Judgment: Poor         DSM-5 Diagnosis    Principal Problem: Major depressive disorder, recurrent severe without psychotic features (HCC)      Psychosocial and Contextual factors:  Financial   Occupational   Relationship X  Legal   Living situation X  Educational     Past Medical History:   Diagnosis Date    Acne     Anxiety     Depression     Headache(784.0)     Hypoglycemia     Irritable bowel syndrome         PATIENT HANDOFF:  Home medications reviewed.   Medication management per attending  Monitor need and frequency of PRN medications.    CONSULT:  [x] Yes [] No  Internal medicine for medical management/medical H&P      Risk Management: close watch per standard protocol      Psychotherapy: participation in milieu and group and individual sessions with Attending Physician,  and Physician Assistant/CNP      Estimated
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
## 2024-09-25 NOTE — GROUP NOTE
Group Therapy Note    Date: 9/25/2024    Group Start Time: 1430  Group End Time: 1515  Group Topic: Psychoeducation    [facility]    Group Therapy Note    Attendees: 8/19     Patient's Goal:  Patient will identify healthy coping skills and benefit of safety plan    Notes:  Patient attended group and participated    Status After Intervention:  Improved    Participation Level: Active Listener and Interactive    Participation Quality: Appropriate, Attentive, Sharing, and Supportive      Speech:  normal      Thought Process/Content: Logical  Linear      Affective Functioning: Constricted      Mood: euthymic      Level of consciousness:  Alert, Oriented x4, and Attentive      Response to Learning: Able to verbalize current knowledge/experience, Able to verbalize/acknowledge new learning, Able to retain information, Capable of insight, Able to change behavior, and Progressing to goal      Endings: None Reported    Modes of Intervention: Education, Support, Socialization, and Exploration      Discipline Responsible: Psychoeducational Specialist      Signature:  MACKENZIE Looney
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
## 2024-09-25 NOTE — PROGRESS NOTES
Pharmacy Med Education Group Note    Date: 09/25/24  Start Time: 1330  End Time: 1400    Number Participants in Group:  12    Goal:  Patient will demonstrate an understanding of the medication’s intended purpose and possible adverse effects  Topic: [place] for Pharmacy Med Ed Group    Discipline Responsible:     OT  AT  [facility].  RT     X Other       Participation Level:     None  Minimal      X Active Listener    X Interactive    Monopolizing         Participation Quality:    X Appropriate  Inappropriate     X       Attentive        Intrusive          Sharing        Resistant          Supportive        Lethargic       Affective:     X Congruent  Incongruent  Blunted  Flat    Constricted  Anxious  Elated  Angry    Labile  Depressed  Other         Cognitive:    X Alert  Oriented PPTP     Concentration   X G  F  P   Attention Span   X G  F  P   Short-Term Memory   X G  F  P   Long-Term Memory  G  F  P   ProblemSolving/  Decision Making  G  F  P   Ability to Process  Information   X G  F  P      Contributing Factors             Delusional             Hallucinating             Flight of Ideas             Other:       Modes of Intervention:    X Education   X Support  Exploration    Clarifying  Problem Solving  Confrontation    Socialization  Limit Setting  Reality Testing    Activity  Movement  Media    Other:            Response to Learning:    X Able to verbalize current knowledge/experience    Able to verbalize/acknowledge new learning    Able to retain information    Capable of insight    Able to change behavior    Progressing to goal    Other:      Noemy Garcia PharmD, [facility]  Psychiatric Pharmacy Resident - PGY2  9/25/2024 2:22 PM
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
## 2024-09-25 NOTE — H&P
Musculoskeletal: Negative for falls and joint pain.   Skin: Negative for itching and rash.   Neurological: Negative for tremors, seizures and weakness.   Endo/Heme/Allergies: Does not bruise/bleed easily.      Mental Status Examination:    Level of consciousness: Awake and alert  Appearance:  Appropriate attire, seated on bed, fair grooming   Behavior/Motor: Approachable, engages with interviewer, no psychomotor abnormalities. Tearful.   Attitude toward examiner:  Cooperative, attentive, good eye contact  Speech: Normal rate, volume, and tone.  Mood: Depressed.   Affect: Mood-congruent. Tearful.   Thought processes:  Linear and coherent.  Thought content: Active suicidal ideations, without current plan or intent              Denies homicidal ideations               Denies hallucinations              Denies delusions              Denies paranoia  Cognition:  Oriented to self, location, time, situation  Concentration: Clinically adequate  Memory: Intact  Insight &Judgment: Poor         DSM-5 Diagnosis    Principal Problem: Depression with suicidal ideation    ***      Psychosocial and Contextual factors:  Financial   Occupational   Relationship X  Legal   Living situation X  Educational     Past Medical History:   Diagnosis Date    Acne     Anxiety     Depression     Headache(784.0)     Hypoglycemia     Irritable bowel syndrome         PATIENT HANDOFF:  Home medications reviewed.   Medication management per attending  Monitor need and frequency of PRN medications.    CONSULT:  [x] Yes [] No  Internal medicine for medical management/medical H&P      Risk Management: close watch per standard protocol      Psychotherapy: participation in milieu and group and individual sessions with Attending Physician,  and Physician Assistant/CNP      Estimated length of stay:  2-14 days      GENERAL PATIENT/FAMILY EDUCATION  Patient will understand basic signs and symptoms, patient will understand benefits/risks and

## 2024-09-25 NOTE — PROGRESS NOTES
RT ASSESSMENT TREATMENT GOALS    [x]Pt Goal:  Pt will identify 1-2 positive coping skills by time of discharge.    [x]Pt Goal:  Pt will identify 1-2 positive aspects of self by time of discharge.    []Pt Goal:  Pt will remain on task/topic for 15-30 minutes during group by time of discharge.    []Pt Goal:  Pt will identify 1-2 aspects of relapse prevention plan by time of discharge.    []Pt Goal:  Pt will join in conversation with peers 1-2 times per group by time of discharge.    []Pt Goal:  Pt will identify 1-2 new leisure interests by time of discharge.    []Pt Goal: Pt will maintain behavorial control until the time of discharge. '

## 2024-09-25 NOTE — GROUP NOTE
Group Therapy Note    Date: 9/25/2024    Group Start Time: 1000  Group End Time: 1045  Group Topic: Psychoeducation    [facility]ace Bahena LSW        Group Therapy Note    Attendees: 10/18       Patient's Goal:  cognitive distortions     Notes:  therapeutic worksheet provided and discussed     Status After Intervention:  Improved    Participation Level: Active Listener and Interactive    Participation Quality: Appropriate      Speech:  normal      Thought Process/Content: Logical      Affective Functioning: Blunted      Mood: anxious      Level of consciousness:  Alert and Oriented x4      Response to Learning: Able to verbalize current knowledge/experience and Able to verbalize/acknowledge new learning      Endings: None Reported    Modes of Intervention: Education and Support      Discipline Responsible: /Counselor      Signature:  BRITTANEY Maldonado
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
## 2024-09-25 NOTE — PROGRESS NOTES
Behavioral Services  Medicare Certification Upon Admission    I certify that this patient's inpatient psychiatric hospital admission is medically necessary for:    [x] (1) Treatment which could reasonably be expected to improve this patient's condition,       [x] (2) Or for diagnostic study;     AND     [x](2) The inpatient psychiatric services are provided while the individual is under the care of a physician and are included in the individualized plan of care.    Estimated length of stay/service 2-9 days    Plan for post-hospital care -outpatient care    Electronically signed by DAMARIS PLATA MD on 9/25/2024 at 9:09 AM

## 2024-09-26 LAB
ALBUMIN SERPL-MCNC: 4.3 G/DL (ref 3.5–5.2)
ALP SERPL-CCNC: 62 U/L (ref 35–104)
ALT SERPL-CCNC: 16 U/L (ref 5–33)
ANION GAP SERPL CALCULATED.3IONS-SCNC: 11 MMOL/L (ref 9–17)
AST SERPL-CCNC: 14 U/L
BASOPHILS # BLD: 0 K/UL (ref 0–0.2)
BASOPHILS NFR BLD: 0 % (ref 0–2)
BILIRUB SERPL-MCNC: 0.6 MG/DL (ref 0.3–1.2)
BUN SERPL-MCNC: 10 MG/DL (ref 6–20)
CALCIUM SERPL-MCNC: 9.2 MG/DL (ref 8.6–10.4)
CHLORIDE SERPL-SCNC: 104 MMOL/L (ref 98–107)
CO2 SERPL-SCNC: 24 MMOL/L (ref 20–31)
CREAT SERPL-MCNC: 0.7 MG/DL (ref 0.5–0.9)
EOSINOPHIL # BLD: 0.1 K/UL (ref 0–0.4)
EOSINOPHILS RELATIVE PERCENT: 1 % (ref 0–4)
ERYTHROCYTE [DISTWIDTH] IN BLOOD BY AUTOMATED COUNT: 12.6 % (ref 11.5–14.9)
GFR, ESTIMATED: >90 ML/MIN/1.73M2
GLUCOSE SERPL-MCNC: 93 MG/DL (ref 70–99)
HCT VFR BLD AUTO: 43.1 % (ref 36–46)
HGB BLD-MCNC: 14.6 G/DL (ref 12–16)
LYMPHOCYTES NFR BLD: 2.2 K/UL (ref 1–4.8)
LYMPHOCYTES RELATIVE PERCENT: 30 % (ref 24–44)
MCH RBC QN AUTO: 32.1 PG (ref 26–34)
MCHC RBC AUTO-ENTMCNC: 34 G/DL (ref 31–37)
MCV RBC AUTO: 94.3 FL (ref 80–100)
MONOCYTES NFR BLD: 0.5 K/UL (ref 0.1–1.3)
MONOCYTES NFR BLD: 7 % (ref 1–7)
NEUTROPHILS NFR BLD: 62 % (ref 36–66)
NEUTS SEG NFR BLD: 4.4 K/UL (ref 1.3–9.1)
PLATELET # BLD AUTO: 300 K/UL (ref 150–450)
PMV BLD AUTO: 7.3 FL (ref 6–12)
POTASSIUM SERPL-SCNC: 4.4 MMOL/L (ref 3.7–5.3)
PROT SERPL-MCNC: 7.6 G/DL (ref 6.4–8.3)
RBC # BLD AUTO: 4.57 M/UL (ref 4–5.2)
SODIUM SERPL-SCNC: 139 MMOL/L (ref 135–144)
WBC OTHER # BLD: 7.2 K/UL (ref 3.5–11)

## 2024-09-26 PROCEDURE — 99232 SBSQ HOSP IP/OBS MODERATE 35: CPT | Performed by: PSYCHIATRY & NEUROLOGY

## 2024-09-26 PROCEDURE — APPSS30 APP SPLIT SHARED TIME 16-30 MINUTES

## 2024-09-26 PROCEDURE — 6370000000 HC RX 637 (ALT 250 FOR IP): Performed by: PSYCHIATRY & NEUROLOGY

## 2024-09-26 PROCEDURE — 1240000000 HC EMOTIONAL WELLNESS R&B

## 2024-09-26 PROCEDURE — 80053 COMPREHEN METABOLIC PANEL: CPT

## 2024-09-26 PROCEDURE — 36415 COLL VENOUS BLD VENIPUNCTURE: CPT

## 2024-09-26 PROCEDURE — 85025 COMPLETE CBC W/AUTO DIFF WBC: CPT

## 2024-09-26 RX ADMIN — HYDROXYZINE HYDROCHLORIDE 50 MG: 50 TABLET, FILM COATED ORAL at 21:09

## 2024-09-26 RX ADMIN — IBUPROFEN 400 MG: 400 TABLET, FILM COATED ORAL at 07:13

## 2024-09-26 RX ADMIN — SERTRALINE 100 MG: 100 TABLET, FILM COATED ORAL at 08:45

## 2024-09-26 RX ADMIN — TRAZODONE HYDROCHLORIDE 50 MG: 50 TABLET ORAL at 21:09

## 2024-09-26 RX ADMIN — HYDROXYZINE HYDROCHLORIDE 50 MG: 50 TABLET, FILM COATED ORAL at 11:05

## 2024-09-26 RX ADMIN — NICOTINE POLACRILEX 2 MG: 2 LOZENGE ORAL at 12:20

## 2024-09-26 RX ADMIN — ACETAMINOPHEN 650 MG: 325 TABLET ORAL at 03:39

## 2024-09-26 RX ADMIN — NICOTINE POLACRILEX 2 MG: 2 LOZENGE ORAL at 09:56

## 2024-09-26 RX ADMIN — PROPRANOLOL HYDROCHLORIDE 10 MG: 20 TABLET ORAL at 08:45

## 2024-09-26 RX ADMIN — ARIPIPRAZOLE 5 MG: 5 TABLET ORAL at 08:45

## 2024-09-26 RX ADMIN — NICOTINE POLACRILEX 2 MG: 2 LOZENGE ORAL at 21:09

## 2024-09-26 RX ADMIN — PROPRANOLOL HYDROCHLORIDE 10 MG: 20 TABLET ORAL at 14:00

## 2024-09-26 RX ADMIN — PROPRANOLOL HYDROCHLORIDE 10 MG: 20 TABLET ORAL at 21:08

## 2024-09-26 RX ADMIN — NICOTINE POLACRILEX 2 MG: 2 LOZENGE ORAL at 07:33

## 2024-09-26 RX ADMIN — NICOTINE POLACRILEX 2 MG: 2 LOZENGE ORAL at 13:59

## 2024-09-26 RX ADMIN — NICOTINE POLACRILEX 2 MG: 2 LOZENGE ORAL at 16:20

## 2024-09-26 RX ADMIN — NICOTINE POLACRILEX 2 MG: 2 LOZENGE ORAL at 18:18

## 2024-09-26 ASSESSMENT — PAIN SCALES - GENERAL
PAINLEVEL_OUTOF10: 6
PAINLEVEL_OUTOF10: 1
PAINLEVEL_OUTOF10: 0
PAINLEVEL_OUTOF10: 5

## 2024-09-26 ASSESSMENT — PAIN DESCRIPTION - LOCATION
LOCATION: BACK
LOCATION: BACK

## 2024-09-26 ASSESSMENT — PAIN - FUNCTIONAL ASSESSMENT
PAIN_FUNCTIONAL_ASSESSMENT: ACTIVITIES ARE NOT PREVENTED
PAIN_FUNCTIONAL_ASSESSMENT: ACTIVITIES ARE NOT PREVENTED

## 2024-09-26 ASSESSMENT — PAIN DESCRIPTION - ORIENTATION
ORIENTATION: LOWER
ORIENTATION: LOWER

## 2024-09-26 ASSESSMENT — PAIN DESCRIPTION - DESCRIPTORS
DESCRIPTORS: THROBBING
DESCRIPTORS: THROBBING

## 2024-09-26 NOTE — PLAN OF CARE
Problem: Depression  Goal: Will be euthymic at discharge  Description: INTERVENTIONS:  1. Administer medication as ordered  2. Provide emotional support via 1:1 interaction with staff  3. Encourage involvement in milieu/groups/activities  4. Monitor for social isolation  9/26/2024 1017 by Fatmata De La Cruz RN  Outcome: Progressing     Problem: Anxiety  Goal: Will report anxiety at manageable levels  Description: INTERVENTIONS:  1. Administer medication as ordered  2. Teach and rehearse alternative coping skills  3. Provide emotional support with 1:1 interaction with staff  9/26/2024 1017 by Fatmata De La Cruz, RN  Outcome: Progressing  Flowsheets (Taken 9/26/2024 1017)  Will report anxiety at manageable levels:   Provide emotional support with 1:1 interaction with staff   Administer medication as ordered   Teach and rehearse alternative coping skills     Problem: Involuntary Admit  Goal: Will cooperate with staff recommendations and doctor's orders and will demonstrate appropriate behavior  Description: INTERVENTIONS:  1. Treat underlying conditions and offer medication as ordered  2. Educate regarding involuntary admission procedures and rules  3. Contain excessive/inappropriate behavior per unit and hospital policies  9/26/2024 1017 by Fatmata De La Cruz RN  Outcome: Progressing  Flowsheets (Taken 9/26/2024 1017)  Will cooperate with staff recommendations and doctor's orders and will demonstrate appropriate behavior:   Contain excessive/inappropriate behavior per unit and hospital policies   Educate regarding involuntary admission procedures and rules   Treat underlying conditions and offer medication as ordered     Problem: Risk for Elopement  Goal: Patient will not exit the unit/facility without proper excort  9/26/2024 1017 by Fatmata De La Cruz, RN  Outcome: Progressing  Flowsheets (Taken 9/26/2024 1017)  Nursing Interventions for Elopement Risk:   Assist with personal care needs such as toileting, eating,  dressing, as needed to reduce the risk of wandering   Collaborate with family members/caregivers to mitigate the elopement risk   Collaborate with treatment team for drug withdrawal symptoms treatment   Collaborate with treatment team for nicotine replacement   Communicate/escalate to charge nurse the risk of elopement   Communicate/escalate to /other team member the risk of elopement   Communicate/escalate to nursing supervisor the risk of elopement   Communicate to physician the risk for elopement   Make sure patient has all necessary personal care items   Escort with two staff members if patient must leave the unit   Place patient in room far away from exits and stairways   Reduce environmental triggers   Shoes and clothing collected and placed in gown attire     Problem: Pain  Goal: Verbalizes/displays adequate comfort level or baseline comfort level  9/26/2024 1017 by Fatmata De La Cruz RN  Outcome: Progressing  Flowsheets (Taken 9/26/2024 1017)  Verbalizes/displays adequate comfort level or baseline comfort level:   Notify Licensed Independent Practitioner if interventions unsuccessful or patient reports new pain   Consider cultural and social influences on pain and pain management   Implement non-pharmacological measures as appropriate and evaluate response   Administer analgesics based on type and severity of pain and evaluate response   Assess pain using appropriate pain scale   Encourage patient to monitor pain and request assistance   Patient reports that she has anxiety and depression has improved. She has been compliant with her medications. She reports a good appetite. She is shocial with peers on the unit. She has showered. She denies thoughts of self harm. Patient encouraged to attend unit programming and interact with peers and staff.  Patient also encouraged to tend to hygiene and ADLs.  Patient encouraged to discuss feelings with staff and feedback and reassurance provided.

## 2024-09-26 NOTE — PROGRESS NOTES
Daily Progress Note  9/26/2024    Patient Name: Bg Bello    CHIEF COMPLAINT:  Depression with suicidal ideation          SUBJECTIVE:      Patient is seen today for a follow up assessment. Vitals and labs reviewed and appear to be within normal limits.  Bg is compliant with scheduled medications. She remains behaviorally in control and has not required emergency medications in the past 24 hours.  Bg is agreeable to assessment in unit sensory room today.  She reports much improvement in her mood.  She reports \"I think this was what I needed to get away from my boyfriend.\"  She reports that she spoke with her parents and they are going to let her move in and help her get her things from her apartment.  She reports having her parents support has really helped improve her mood.  She denies issues with sleep or appetite.    Bg reports improvement in suicidal ideation stating the thoughts are much less intense and severe. She denies homicidal ideation. She denies auditory and visual hallucinations. She denies paranoia.  She denies any medication side effects at this time.    Appetite:  [x] Normal/Adequate/Unchanged  [] Increased  [] Decreased      Sleep:       [x] Normal/Adequate/Unchanged  [] Fair  [] Poor      Group Attendance on Unit:   [x] Yes  [] Selectively    [] No    Medication Side Effects:  Patient denies any medication side effects at the time of assessment.         Mental Status Exam  Level of consciousness: Alert and awake.   Appearance: Appropriate attire for setting, seated in chair, with fair  grooming and hygiene.   Behavior/Motor: Approachable, calm  Attitude toward examiner: Cooperative, attentive, good eye contact.  Speech: Normal rate, normal volume, normal tone.  Mood:  Patient reports \"doing much better\".   Affect: Anxious  Thought processes: Linear and coherent.   Thought content: Denies homicidal ideation.  Suicidal Ideation: Reports improvement in suicidal ideations  Delusions: No

## 2024-09-26 NOTE — PLAN OF CARE
Problem: Depression  Goal: Will be euthymic at discharge  Description: INTERVENTIONS:  1. Administer medication as ordered  2. Provide emotional support via 1:1 interaction with staff  3. Encourage involvement in milieu/groups/activities  4. Monitor for social isolation  9/26/2024 0020 by Dominguez Riddle, RN  Outcome: Progressing  Note: Patient denies any depression currently stating she is much more hopeful after talking with her mother and finding out she could stay with her until she can get her own place. Patient also informed writer her mother was going to go with her when she went to get her belongings out of the house of the now ex-boyfriend. She stated her mother already called the local police to request they also go with them to get the belongings. Patient denies any thoughts to harm self. Patient is bright and social in dayroom. Q 15 minute checks done on pt for safety      Problem: Anxiety  Goal: Will report anxiety at manageable levels  Description: INTERVENTIONS:  1. Administer medication as ordered  2. Teach and rehearse alternative coping skills  3. Provide emotional support with 1:1 interaction with staff  9/26/2024 0020 by Dominguez Riddle RN  Outcome: Progressing  Note: Patient reports some anxiety but reports it has been getting better since her admission. Patient stated she is also happy the doctors restarted her three times a day inderal which helps with her anxiety. Patient did ask about discharge and patient was informed the doctors would most likely want to keep her at least another day due to just having restarted her medications. Patient was accepting of education. Q 15 minute checks done on pt for safety      Problem: Involuntary Admit  Goal: Will cooperate with staff recommendations and doctor's orders and will demonstrate appropriate behavior  Description: INTERVENTIONS:  1. Treat underlying conditions and offer medication as ordered  2. Educate regarding involuntary admission

## 2024-09-26 NOTE — GROUP NOTE
Group Therapy Note    Date: 9/26/2024    Group Start Time: 1000  Group End Time: 1101  Group Topic: Psychotherapy    [facility]    Leti Mcclure MSW, LSW        Group Therapy Note    Attendees: 9/19       Patient's Goal:  Recognizing symptoms of Anxiety, the types of Anxiety and treatments.    Status After Intervention:  Improved    Participation Level: Active Listener and Interactive    Participation Quality: Appropriate and Attentive      Speech:  normal      Thought Process/Content: Logical      Affective Functioning: Congruent      Mood: euthymic      Level of consciousness:  Alert, Oriented x4, and Attentive      Response to Learning: Able to verbalize current knowledge/experience and Able to verbalize/acknowledge new learning      Endings: None Reported    Modes of Intervention: Education, Support, and Socialization      Discipline Responsible: /Counselor      Signature:  LUDWIN Jeronimo LSW

## 2024-09-26 NOTE — GROUP NOTE
Group Therapy Note    Date: 9/26/2024    Group Start Time: 0900  Group End Time: 0945  Group Topic: Group Documentation    Jessie Burgos        Group Therapy Note    Attendees: 9/19         Patient's Goal:  establish healthy boundaries    Notes:  Morning goal group session, patient has opportunity to reflect on what they need to accomplish to achieve discharge, and decide on a step towards that that they would like to accomplish by the end of the day today.     Status After Intervention:  Improved    Participation Level: Active Listener and Interactive    Participation Quality: Appropriate, Attentive, Sharing, and Supportive      Speech:  normal      Thought Process/Content: Logical  Linear      Affective Functioning: Congruent      Mood: euthymic      Level of consciousness:  Alert, Oriented x4, and Attentive      Response to Learning: Capable of insight, Able to change behavior, and Progressing to goal      Endings: None Reported    Modes of Intervention: Education, Support, and Socialization      Discipline Responsible: Behavorial Health Tech      Signature:  Jessie Glover

## 2024-09-26 NOTE — PROGRESS NOTES
09/26/24 1417   Encounter Summary   Encounter Overview/[facility]al [facility]   Service Provided For Patient   Last Encounter  09/26/24   Behavioral Health    Type  [facility]

## 2024-09-26 NOTE — H&P
[facility] Internal Medicine  Yonathan Avendano MD; Mat Ruiz MD, Leonardo Purdy MD, May Domínguez MD, Brett Boss MD; Uday Zhu MD    [facility] Internal Medicine   IN-PATIENT SERVICE   [facility]     HISTORY AND PHYSICAL EXAMINATION            Date:   9/25/2024  Patient name:  Bg Bello  Date of admission:  9/24/2024  6:38 PM  MRN:   [id number]  Account:  [id number]  YOB: 1990  PCP:    Niko Buenrostro MD  Room:   [address] [place] [address]  Code Status:    Full Code      Chief Complaint:     Suicidal /Ac Psychosis    History Obtained From:     Patient/EMR/bedside RN     History of Present Illness:   34-year-old female with underlying history of anxiety, depression, admitted inpatient psych with suicidal ideations      Past Medical History:     Past Medical History:   Diagnosis Date    Acne     Anxiety     Depression     Headache(784.0)     Hypoglycemia     Irritable bowel syndrome         Past Surgical History:     Past Surgical History:   Procedure Laterality Date    WISDOM TOOTH EXTRACTION  5/2014    x 1        Medications Prior to Admission:     Prior to Admission medications    Medication Sig Start Date End Date Taking? Authorizing Provider   propranolol (INDERAL) 10 MG tablet Take 1 tablet by mouth 3 times daily    Rebel Loja MD   ARIPiprazole (ABILIFY) 5 MG tablet Take 1 tablet by mouth daily 4/18/19   Rebel Loja MD   ibuprofen (ADVIL;MOTRIN) 200 MG tablet Take 200 mg by mouth as needed    Rebel Ljoa MD   sertraline (ZOLOFT) 100 MG tablet Take 1 tablet by mouth daily 4/18/19   Rebel Loja MD        Allergies:     Morphine    Social History:     Tobacco:    reports that she has been smoking. She has a 0.8 pack-year smoking history. She has never used smokeless tobacco.  Alcohol:      reports current alcohol use.  Drug Use:  reports no history of drug use.    Family History:     Family History  or splenomegaly  Neurologic: CN II-XII intact , DTR normal, no new focal motor or sensory deficits, moving all extremities spontaneously.   Skin: No gross lesions, rashes, bruising or bleeding on exposed skin area  Extremities:  peripheral pulses palpable, no pedal edema or calf pain with palpation    Investigations:      Laboratory Testing:  No results found for this or any previous visit (from the past 24 hour(s)).    Imaging/Diagonstics:  No results found.    Assessment :      Hospital Problems             Last Modified POA    * (Principal) Major depressive disorder, recurrent severe without psychotic features (HCC) 9/25/2024 Yes    Depression with suicidal ideation 9/25/2024 Yes       Plan:     Admitted to inpatient psych with suicidal ideations,  PTSD, home medication,  Labs no radiology, medications, vitals reviewed,  Overweight, lifestyle modification advised    DVT prophylaxis,  Pt mobile   Full code status       Consultations:   IP CONSULT TO INTERNAL MEDICINE      Uday Zhu MD  9/25/2024  8:37 PM    Copy sent to Niko Chambers MD    Please note that this chart was generated using voice recognition Dragon dictation software.  Although every effort was made to ensure the accuracy of this automated transcription, some errors in transcription may have occurred.

## 2024-09-26 NOTE — GROUP NOTE
Group Therapy Note    Date: 9/25/2024    Group Start Time: 2010  Group End Time: 2035  Group Topic: Topic Group    HOMERO BHDominguez Bravo RN        Group Therapy Note    Attendees: 11           Patient's Goal:  to learn more coping skills    Notes:  ABCS of coping skills    Status After Intervention:  Unchanged    Participation Level: Active Listener and participant    Participation Quality: Appropriate, Attentive, Sharing, and Supportive      Speech:  normal      Thought Process/Content: Logical      Affective Functioning: brightened      Mood: anxious       Level of consciousness:  Alert, Oriented x4, and Attentive      Response to Learning: Able to verbalize current knowledge/experience, Able to verbalize/acknowledge new learning, Able to retain information, Capable of insight, Able to change behavior, and Progressing to goal      Endings: None Reported    Modes of Intervention: Education, Support, Socialization, Exploration, Clarifying, and Activity      Discipline Responsible: Registered Nurse      Signature:  Dominguez Riddle RN

## 2024-09-27 VITALS
HEIGHT: 63 IN | WEIGHT: 160 LBS | SYSTOLIC BLOOD PRESSURE: 123 MMHG | TEMPERATURE: 97.9 F | DIASTOLIC BLOOD PRESSURE: 85 MMHG | RESPIRATION RATE: 15 BRPM | BODY MASS INDEX: 28.35 KG/M2 | OXYGEN SATURATION: 100 % | HEART RATE: 81 BPM

## 2024-09-27 PROCEDURE — 6370000000 HC RX 637 (ALT 250 FOR IP): Performed by: PSYCHIATRY & NEUROLOGY

## 2024-09-27 PROCEDURE — 99239 HOSP IP/OBS DSCHRG MGMT >30: CPT | Performed by: PSYCHIATRY & NEUROLOGY

## 2024-09-27 RX ORDER — ARIPIPRAZOLE 5 MG/1
5 TABLET ORAL DAILY
Qty: 30 TABLET | Refills: 3 | Status: SHIPPED | OUTPATIENT
Start: 2024-09-27

## 2024-09-27 RX ORDER — PROPRANOLOL HCL 10 MG
10 TABLET ORAL 3 TIMES DAILY
Qty: 90 TABLET | Refills: 3 | Status: SHIPPED | OUTPATIENT
Start: 2024-09-27

## 2024-09-27 RX ORDER — SERTRALINE HYDROCHLORIDE 100 MG/1
100 TABLET, FILM COATED ORAL DAILY
Qty: 30 TABLET | Refills: 3 | Status: SHIPPED | OUTPATIENT
Start: 2024-09-27

## 2024-09-27 RX ORDER — TRAZODONE HYDROCHLORIDE 50 MG/1
50 TABLET, FILM COATED ORAL NIGHTLY PRN
Qty: 30 TABLET | Refills: 0 | Status: SHIPPED | OUTPATIENT
Start: 2024-09-27

## 2024-09-27 RX ADMIN — ACETAMINOPHEN 650 MG: 325 TABLET ORAL at 07:07

## 2024-09-27 RX ADMIN — IBUPROFEN 400 MG: 400 TABLET, FILM COATED ORAL at 04:34

## 2024-09-27 RX ADMIN — NICOTINE POLACRILEX 2 MG: 2 LOZENGE ORAL at 12:16

## 2024-09-27 RX ADMIN — SERTRALINE 100 MG: 100 TABLET, FILM COATED ORAL at 08:14

## 2024-09-27 RX ADMIN — ARIPIPRAZOLE 5 MG: 5 TABLET ORAL at 08:14

## 2024-09-27 RX ADMIN — ACETAMINOPHEN 650 MG: 325 TABLET ORAL at 12:36

## 2024-09-27 RX ADMIN — PROPRANOLOL HYDROCHLORIDE 10 MG: 20 TABLET ORAL at 08:14

## 2024-09-27 RX ADMIN — NICOTINE POLACRILEX 2 MG: 2 LOZENGE ORAL at 07:07

## 2024-09-27 RX ADMIN — NICOTINE POLACRILEX 2 MG: 2 LOZENGE ORAL at 10:00

## 2024-09-27 ASSESSMENT — PAIN DESCRIPTION - LOCATION
LOCATION: BACK
LOCATION: BACK

## 2024-09-27 ASSESSMENT — PAIN SCALES - GENERAL
PAINLEVEL_OUTOF10: 5
PAINLEVEL_OUTOF10: 5

## 2024-09-27 ASSESSMENT — PAIN - FUNCTIONAL ASSESSMENT: PAIN_FUNCTIONAL_ASSESSMENT: ACTIVITIES ARE NOT PREVENTED

## 2024-09-27 NOTE — GROUP NOTE
Group Therapy Note    Date: 9/27/2024    Group Start Time: 1010  Group End Time: 1100  Group Topic: Psychotherapy    [facility]    Leti Mcclure MSW, LSW        Group Therapy Note    Attendees: 9/19       Patient's Goal:    Increase interpersonal relationship skills utilizing talk therapy while discussing healthy vs unhealthy coping skills and how we use them in our daily lives.    Status After Intervention:  Improved    Participation Level: Active Listener and Interactive    Participation Quality: Appropriate, Attentive, and Sharing      Speech:  normal      Thought Process/Content: Logical      Affective Functioning: Congruent      Mood: euthymic      Level of consciousness:  Alert, Oriented x4, and Attentive      Response to Learning: Able to verbalize current knowledge/experience, Able to verbalize/acknowledge new learning, and Able to retain information      Endings: None Reported    Modes of Intervention: Education, Support, and Socialization      Discipline Responsible: /Counselor      Signature:  LUDWIN Jeronimo LSW

## 2024-09-27 NOTE — PROGRESS NOTES
CLINICAL PHARMACY NOTE: MEDS TO BEDS    Total # of Prescriptions Filled: 4   The following medications were delivered to the patient:  Vouchered 14 day supply due to lack of prescription insurance  Aripiprazole 5mg  Propranolol 10mg  Sertraline 100mg  Trazodone 50mg    Additional Documentation: 9/27/24 kike delivered to TARA Alejo 12:49pm

## 2024-09-27 NOTE — DISCHARGE SUMMARY
DISCHARGE SUMMARY      Patient ID:  Bg Bello  [id number]  34 y.o.  1990    Admit date: 9/24/2024    Discharge date and time: 9/27/2024    Disposition: Home     Admitting Physician: Miguel Angel Lyn MD     Discharge Physician: Dr ABHINAV Lyn MD    Admission Diagnoses: Depression with suicidal ideation [F32.A, R45.851]    Admission Condition: poor    Discharged Condition: stable    Admission Circumstance: Bg Bello is a 34 y.o. female who has a past medical history of mental health. Patient presented to the ED on application for emergency admission for worsening depression with suicidal ideation with a plan. It does not appear patient has ever been admitted to this facility in the past.         Patient is agreeable to assessment at bedside today. Bg is very tearful and crying during assessment, but is cooperative and forthcoming. Patient states that she has been having worsening depression over the last couple of weeks. 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 This left her without insurance and she was unable to fill her medications. Bg was previously taking Abilify, Zoloft, Propanolol, and Atarax. She has been without her medications for at least 2 weeks. She is now working full time again, but her benefits will not start until November. She has felt this worsening depression and suicidal thoughts with a plan for the last couple of weeks. She reports that she has had thoughts that \"I did not want to make it to 34\" and \"I feel unimportant\". Bg has been having relationship issues with her current boyfriend who she has been with for the last 10 years, on/off. Prior to arrival, they had an argument and he was sending her threatening text messages and she called the . She states the police did not do anything because he was a previous  and \"pushed it off\". Their fights have increased and he  change      Diagnosis:  Principal Problem:    Major depressive disorder, recurrent severe without psychotic features (HCC)  Active Problems:    Depression with suicidal ideation  Resolved Problems:    * No resolved hospital problems. *      LABS:    Recent Labs     09/26/24 0722   WBC 7.2   HGB 14.6        Recent Labs     09/26/24 0722      K 4.4      CO2 24   BUN 10   CREATININE 0.7   GLUCOSE 93     Recent Labs     09/26/24 0722   BILITOT 0.6   ALKPHOS 62   AST 14   ALT 16     No results found for: \"LABAMPH\", \"BARBSCNU\", \"LABBENZ\", \"CANNAB\", \"LABMETH\", \"PPXUR\", \"ETOH\"  No results found for: \"TSH\", \"FREET4\"  No results found for: \"LITHIUM\"  No results found for: \"VALPROATE\", \"CBMZ\"    RISK ASSESSMENT AT DISCHARGE: Low risk for suicide and homicide.     Treatment Plan:  Reviewed current Medications with the patient. Education provided on the complaince with treatment.    Risks, benefits, side effects, drug-to-drug interactions and alternatives to treatment were discussed.    Encourage patient to attend outpatient follow up appointment and therapy.    Patient was advised to call the outpatient provider, visit the nearest ED or call 911 if symptoms are not manageable.        Medication List        START taking these medications      traZODone 50 MG tablet  Commonly known as: DESYREL  Take 1 tablet by mouth nightly as needed for Sleep            CONTINUE taking these medications      ARIPiprazole 5 MG tablet  Commonly known as: ABILIFY  Take 1 tablet by mouth daily     ibuprofen 200 MG tablet  Commonly known as: ADVIL;MOTRIN     propranolol 10 MG tablet  Commonly known as: INDERAL  Take 1 tablet by mouth 3 times daily     sertraline 100 MG tablet  Commonly known as: ZOLOFT  Take 1 tablet by mouth daily            STOP taking these medications      fluconazole 150 MG tablet  Commonly known as: DIFLUCAN     medroxyPROGESTERone 150 MG/ML injection  Commonly known as: DEPO-PROVERA     triamcinolone 0.1

## 2024-09-27 NOTE — PLAN OF CARE
Problem: Depression  Goal: Will be euthymic at discharge  Description: INTERVENTIONS:  1. Administer medication as ordered  2. Provide emotional support via 1:1 interaction with staff  3. Encourage involvement in milieu/groups/activities  4. Monitor for social isolation  9/26/2024 2231 by Katelyn Shah LPN  Outcome: Progressing     Problem: Anxiety  Goal: Will report anxiety at manageable levels  Description: INTERVENTIONS:  1. Administer medication as ordered  2. Teach and rehearse alternative coping skills  3. Provide emotional support with 1:1 interaction with staff  9/26/2024 2231 by Katelyn Shah LPN  Outcome: Progressing    Problem: Involuntary Admit  Goal: Will cooperate with staff recommendations and doctor's orders and will demonstrate appropriate behavior  Description: INTERVENTIONS:  1. Treat underlying conditions and offer medication as ordered  2. Educate regarding involuntary admission procedures and rules  3. Contain excessive/inappropriate behavior per unit and hospital policies  9/26/2024 2231 by Katelyn Shah LPN  Outcome: Progressing     Problem: Risk for Elopement  Goal: Patient will not exit the unit/facility without proper excort  9/26/2024 2231 by Katelyn Shah LPN  Outcome: Progressing     Problem: Pain  Goal: Verbalizes/displays adequate comfort level or baseline comfort level  9/26/2024 2231 by Katelyn Shah LPN  Outcome: Progress  Patient observed in milieu social with peers. Patient approachable and calm for assessment. Patient is behaviorally controlled, appropriate with peers and staff and follows unit rules. Will continue to encourage patient to demonstrate appropriate behavior and reorient patient as needed. Patient reports mild anxiety and states her mood is better. Patient became tearful during assessment, but states she is fine and happy she came here. Feedback and reassurance provided. Medication was given as prescribed for anxiety and sleep. Patient

## 2024-09-27 NOTE — TRANSITION OF CARE
Behavioral Health Transition Record    Patient Name: Bg Bello  YOB: 1990   Medical Record Number: [id number]  Date of Admission: 9/24/2024  6:38 PM   Date of Discharge: 9/27/2024    Attending Provider: Miguel Angel Lyn MD   Discharging Provider: Eboni  To contact this individual call [phone] and ask the  to page.  If unavailable, ask to be transferred to Behavioral Health Provider on call.  A Behavioral Health Provider will be available on call 24/7 and during holidays.    Primary Care Provider: Niko Buenrostro MD    Allergies   Allergen Reactions    Morphine      Gets shooting back pain and nausea       Reason for Admission:     Admission Diagnosis: Depression with suicidal ideation [F32.A, R45.851]    * No surgery found *    Results for orders placed or performed during the hospital encounter of 09/24/24   CBC with Auto Differential   Result Value Ref Range    WBC 7.2 3.5 - 11.0 k/uL    RBC 4.57 4.0 - 5.2 m/uL    Hemoglobin 14.6 12.0 - 16.0 g/dL    Hematocrit 43.1 36 - 46 %    MCV 94.3 80 - 100 fL    MCH 32.1 26 - 34 pg    MCHC 34.0 31 - 37 g/dL    RDW 12.6 11.5 - 14.9 %    Platelets 300 150 - 450 k/uL    MPV 7.3 6.0 - 12.0 fL    Neutrophils % 62 36 - 66 %    Lymphocytes % 30 24 - 44 %    Monocytes % 7 1 - 7 %    Eosinophils % 1 0 - 4 %    Basophils % 0 0 - 2 %    Neutrophils Absolute 4.40 1.3 - 9.1 k/uL    Lymphocytes Absolute 2.20 1.0 - 4.8 k/uL    Monocytes Absolute 0.50 0.1 - 1.3 k/uL    Eosinophils Absolute 0.10 0.0 - 0.4 k/uL    Basophils Absolute 0.00 0.0 - 0.2 k/uL   Comprehensive Metabolic Panel   Result Value Ref Range    Sodium 139 135 - 144 mmol/L    Potassium 4.4 3.7 - 5.3 mmol/L    Chloride 104 98 - 107 mmol/L    CO2 24 20 - 31 mmol/L    Anion Gap 11 9 - 17 mmol/L    Glucose 93 70 - 99 mg/dL    BUN 10 6 - 20 mg/dL    Creatinine 0.7 0.5 - 0.9 mg/dL    Est, Glom Filt Rate >90 >60 mL/min/1.73m2    Calcium 9.2 8.6 - 10.4 mg/dL    Total Protein 7.6 6.4 - 8.3 g/dL    Albumin  Patient declined  Patient was offered medication to assist with tobacco cessation at discharge? Patient declined    Alcohol/Substance Abuse Discharge Plan:   Does the patient have a history of substance/alcohol abuse and requires a referral for treatment? No  Patient referred to the following for substance/alcohol abuse treatment with an appointment? No  Patient was offered medication to assist with substance/alcohol abuse cessation at discharge? No      Patient discharged to: Home; Transition record discussed with patient/caregiver and provided this record in hard copy or electronically

## 2024-09-27 NOTE — BH NOTE
Patient given tobacco quitline number 27971006479 at this time, refusing to call at this time, states \" I just dont want to quit now\"- patient given information as to the dangers of long term tobacco use. Continue to reinforce the importance of tobacco cessation.

## 2024-09-27 NOTE — PLAN OF CARE
Problem: Depression  Goal: Will be euthymic at discharge  Description: INTERVENTIONS:  1. Administer medication as ordered  2. Provide emotional support via 1:1 interaction with staff  3. Encourage involvement in milieu/groups/activities  4. Monitor for social isolation  9/27/2024 1010 by Jud Billy, RN  Outcome: Progressing     Problem: Anxiety  Goal: Will report anxiety at manageable levels  Description: INTERVENTIONS:  1. Administer medication as ordered  2. Teach and rehearse alternative coping skills  3. Provide emotional support with 1:1 interaction with staff  9/27/2024 1010 by Jud Billy, RN  Outcome: Progressing     Problem: Involuntary Admit  Goal: Will cooperate with staff recommendations and doctor's orders and will demonstrate appropriate behavior  Description: INTERVENTIONS:  1. Treat underlying conditions and offer medication as ordered  2. Educate regarding involuntary admission procedures and rules  3. Contain excessive/inappropriate behavior per unit and hospital policies  9/27/2024 1010 by Jud Billy, RN  Outcome: Progressing     Problem: Risk for Elopement  Goal: Patient will not exit the unit/facility without proper excort  9/27/2024 1010 by Jud Billy, RN  Outcome: Progressing

## 2024-09-27 NOTE — DISCHARGE INSTRUCTIONS
Information:  Medications:   Medication summary provided   I understand that I should take only the medications on my list.     -why and when I need to take each medicine.     -which side effects to watch for.     -that I should carry my medication information at all times in case of     Emergency situations.    I will take all of my medicines to follow up appointments.     -check with my physician or pharmacist before taking any new    Medication, over the counter product or drink alcohol.    -Ask about food, drug or dietary supplement interactions.    -discard old lists and update records with medication providers.    Notify Physician:  Notify physician if you notice:   Always call 911 if you feel your life is in danger  In case of an emergency call 911 immediately!  If 911 is not available call your local emergency medical system for help    Behavioral Health Follow Up:  Original Referral Source: [facility]  Discharge Diagnosis: Depression with suicidal ideation [F32.A, R45.851]  Recommendations for Level of Care: Community mental health follow-up  Patient status at discharge: Alert and oriented x4, calm and cooperative  My hospital  was: [facility]  Aftercare plan faxed: Yes   -faxed by: RN   -date: 9/27/2024   -time: 14:00  Prescriptions: Sent home with patient    Smoking: Quit Smoking.   Call the [facility]'s smoking quitline at 7-507-50J-QUIT  Know the signs of a heart attack   If you have any of the following symptoms call 911 immediately, do not wait more    Than five minutes.    1. Pressure, fullness and/ or squeezing in the center of the chest spreading to    The jaw, neck or shoulder.    2. Chest discomfort with light headedness, fainting, sweating, nausea or    Shortness of breath.   3. Upper abdominal pressure or discomfort.   4. Lower chest pain, back pain, unusual fatigue, shortness of breath, nausea   Or dizziness.     General Information:   Questions regarding your bill: Call HELP program ([phone])

## 2024-09-27 NOTE — BH NOTE
[facility]al [facility]  Discharge Note    Pt discharged with followings belongings:   Dental Appliances: None  Vision - Corrective Lenses: None  Hearing Aid: None  Jewelry: Body Piercing, Necklace, Bracelet, Ring (2 rings, one bracelet on her, two necklaces removed from patient, gauge earrings and nipple piercings left on)  Body Piercings Removed: No  Clothing: Footwear, Pants, Sweater  Other Valuables: Money, Credit/Debit Card, Keys, Wallet (10 dollars,  license, two employee IDs, 2 vape pens)   Valuables returned to patient. Patient educated on aftercare instructions: Yes  Information faxed to [facility] by RN  at 1:48 PM .Patient verbalize understanding of AVS:  Yes.    Status EXAM upon discharge:  Mental Status and Behavioral Exam  Normal: No  Level of Assistance: Independent/Self  Facial Expression: Brightened  Affect: Appropriate  Level of Consciousness: Alert  Frequency of Checks: 4 times per hour, close  Mood:Normal: No  Mood: Anxious  Motor Activity:Normal: Yes  Eye Contact: Good  Observed Behavior: Cooperative, Friendly  Sexual Misconduct History: Current - no  Preception: [place] to person, [place] to time, [place] to place, [place] to situation  Attention:Normal: Yes  Attention: Distractible  Thought Processes: Unremarkable  Thought Content:Normal: Yes  Depression Symptoms: No problems reported or observed.  Anxiety Symptoms: Generalized  Christina Symptoms: No problems reported or observed.  Hallucinations: None  Delusions: No  Memory:Normal: Yes  Insight and Judgment: Yes  Insight and Judgment: Poor judgment, Poor insight    Tobacco Screening:  Practical Counseling, on admission, dell X, if applicable and completed (first 3 are required if patient doesn't refuse):            ( ) Recognizing danger situations (included triggers and roadblocks)                    ( ) Coping skills (new ways to manage stress,relaxation techniques, changing routine, distraction)